# Patient Record
Sex: FEMALE | Race: WHITE | NOT HISPANIC OR LATINO | URBAN - METROPOLITAN AREA
[De-identification: names, ages, dates, MRNs, and addresses within clinical notes are randomized per-mention and may not be internally consistent; named-entity substitution may affect disease eponyms.]

---

## 2021-04-28 ENCOUNTER — OUTPATIENT (OUTPATIENT)
Dept: OUTPATIENT SERVICES | Facility: HOSPITAL | Age: 26
LOS: 1 days | Discharge: HOME | End: 2021-04-28

## 2021-04-29 DIAGNOSIS — F10.21 ALCOHOL DEPENDENCE, IN REMISSION: ICD-10-CM

## 2021-04-29 DIAGNOSIS — F34.1 DYSTHYMIC DISORDER: ICD-10-CM

## 2021-04-29 DIAGNOSIS — F50.9 EATING DISORDER, UNSPECIFIED: ICD-10-CM

## 2021-08-09 ENCOUNTER — LABORATORY RESULT (OUTPATIENT)
Age: 26
End: 2021-08-09

## 2021-08-09 ENCOUNTER — NON-APPOINTMENT (OUTPATIENT)
Age: 26
End: 2021-08-09

## 2021-08-09 ENCOUNTER — APPOINTMENT (OUTPATIENT)
Dept: INTERNAL MEDICINE | Facility: CLINIC | Age: 26
End: 2021-08-09
Payer: COMMERCIAL

## 2021-08-09 VITALS — SYSTOLIC BLOOD PRESSURE: 120 MMHG | DIASTOLIC BLOOD PRESSURE: 80 MMHG

## 2021-08-09 VITALS
BODY MASS INDEX: 23.54 KG/M2 | SYSTOLIC BLOOD PRESSURE: 128 MMHG | HEIGHT: 67 IN | WEIGHT: 150 LBS | DIASTOLIC BLOOD PRESSURE: 80 MMHG | OXYGEN SATURATION: 99 % | TEMPERATURE: 98.3 F | HEART RATE: 82 BPM

## 2021-08-09 DIAGNOSIS — Z81.8 FAMILY HISTORY OF OTHER MENTAL AND BEHAVIORAL DISORDERS: ICD-10-CM

## 2021-08-09 DIAGNOSIS — R23.3 SPONTANEOUS ECCHYMOSES: ICD-10-CM

## 2021-08-09 DIAGNOSIS — Z01.84 ENCOUNTER FOR ANTIBODY RESPONSE EXAMINATION: ICD-10-CM

## 2021-08-09 DIAGNOSIS — J03.01 ACUTE RECURRENT STREPTOCOCCAL TONSILLITIS: ICD-10-CM

## 2021-08-09 DIAGNOSIS — Z78.9 OTHER SPECIFIED HEALTH STATUS: ICD-10-CM

## 2021-08-09 DIAGNOSIS — Q12.0 CONGENITAL CATARACT: ICD-10-CM

## 2021-08-09 DIAGNOSIS — Z00.00 ENCOUNTER FOR GENERAL ADULT MEDICAL EXAMINATION W/OUT ABNORMAL FINDINGS: ICD-10-CM

## 2021-08-09 DIAGNOSIS — Z82.0 FAMILY HISTORY OF EPILEPSY AND OTHER DISEASES OF THE NERVOUS SYSTEM: ICD-10-CM

## 2021-08-09 PROCEDURE — 36415 COLL VENOUS BLD VENIPUNCTURE: CPT

## 2021-08-09 PROCEDURE — 99204 OFFICE O/P NEW MOD 45 MIN: CPT | Mod: 25

## 2021-08-09 RX ORDER — MULTIVITAMIN
TABLET ORAL
Refills: 0 | Status: ACTIVE | COMMUNITY

## 2021-08-09 NOTE — PHYSICAL EXAM
[Normal] : normal rate, regular rhythm, normal S1 and S2 and no murmur heard [No Edema] : there was no peripheral edema [Soft] : abdomen soft [Non Tender] : non-tender [Normal Gait] : normal gait [Normal Affect] : the affect was normal [Normal Mood] : the mood was normal [No Lymphadenopathy] : no lymphadenopathy [Supple] : supple [Coordination Grossly Intact] : coordination grossly intact [de-identified] : very friendly [de-identified] : cracked right upper molar tooth  [de-identified] : scattered ecchymotic areas on UE/LE

## 2021-08-09 NOTE — HEALTH RISK ASSESSMENT
[2] : 2) Feeling down, depressed, or hopeless for more than half of the days (2) [PHQ-2 Positive] : PHQ-2 Positive [PHQ-9 Positive] : PHQ-9 Positive [I have developed a follow-up plan documented below in the note.] : I have developed a follow-up plan documented below in the note.

## 2021-08-09 NOTE — PLAN
[FreeTextEntry1] : Will refer her to neurology Dr. Hall for evaluation of headaches, insomnia, and hand tremor.\par She is followed with psychiatrist, on Atomoxetine for ADHD and Buspirone for anxiety. She will soon be started on anti-depressant but unsure of medication name. \par She has spontaneous ecchymosis. Check CBC. \par Check routine annual labs today and will return in 2 weeks for a physical. \par Referred to GYN for annual and PAP smear, no prior studies. \par I referred her to a general dentist.\par \par RTO 2 weeks for physical. 
no weight-bearing restrictions

## 2021-08-09 NOTE — HISTORY OF PRESENT ILLNESS
[FreeTextEntry8] : 25 year old F comes to establish medical care. \par \par She has history of depression/anxiety and ADHD. She is followed with psychiatrist Dr. Chery Marie and followed with telemedicine visits. She was on Zoloft but now being weaned off. Prior on Clonidine but were discontinued. Currently on Buspirone and Atomoxetine. She will soon be started on new antidepressant soon. \par \par About 3 weeks ago, she fell in shower and hit front of head. She is not sure if had LOC. She had amnesia after incident. She went to [facility] ED and had negative CT head. She has issues with sleep. She feels in control of her dreams and often gets stuck in them. As a result, she often feels tired during day. She would like to see neurologist.\par \par She recently cracked her molar tooth on right upper side. She is trying to find a dentist.

## 2021-08-09 NOTE — REVIEW OF SYSTEMS
[Anxiety] : anxiety [Depression] : depression [Negative] : Heme/Lymph [Fatigue] : fatigue [Suicidal] : not suicidal [Insomnia] : insomnia [de-identified] : spontaneous bruising on body

## 2021-08-13 ENCOUNTER — NON-APPOINTMENT (OUTPATIENT)
Age: 26
End: 2021-08-13

## 2021-08-13 ENCOUNTER — APPOINTMENT (OUTPATIENT)
Dept: NEUROLOGY | Facility: CLINIC | Age: 26
End: 2021-08-13
Payer: COMMERCIAL

## 2021-08-13 VITALS
DIASTOLIC BLOOD PRESSURE: 75 MMHG | BODY MASS INDEX: 22.76 KG/M2 | HEART RATE: 99 BPM | TEMPERATURE: 97.2 F | SYSTOLIC BLOOD PRESSURE: 113 MMHG | HEIGHT: 67 IN | WEIGHT: 145 LBS

## 2021-08-13 DIAGNOSIS — G89.29 HEADACHE, UNSPECIFIED: ICD-10-CM

## 2021-08-13 DIAGNOSIS — F51.02 ADJUSTMENT INSOMNIA: ICD-10-CM

## 2021-08-13 DIAGNOSIS — R51.9 HEADACHE, UNSPECIFIED: ICD-10-CM

## 2021-08-13 PROCEDURE — 99204 OFFICE O/P NEW MOD 45 MIN: CPT

## 2021-08-13 RX ORDER — SERTRALINE HYDROCHLORIDE 100 MG/1
100 TABLET, FILM COATED ORAL
Qty: 30 | Refills: 0 | Status: DISCONTINUED | COMMUNITY
Start: 2021-08-04 | End: 2021-08-13

## 2021-08-13 RX ORDER — CLONIDINE HYDROCHLORIDE 0.1 MG/1
0.1 TABLET ORAL
Qty: 60 | Refills: 0 | Status: DISCONTINUED | COMMUNITY
Start: 2021-04-11 | End: 2021-08-13

## 2021-08-13 RX ORDER — BUPROPION HYDROCHLORIDE 100 MG/1
100 TABLET, FILM COATED, EXTENDED RELEASE ORAL
Qty: 30 | Refills: 0 | Status: DISCONTINUED | COMMUNITY
Start: 2021-04-18 | End: 2021-08-13

## 2021-08-13 NOTE — DISCUSSION/SUMMARY
[FreeTextEntry1] : She is 25-year-old patient with a history of anxiety and depression with ADHD under care of psychiatrist coming here for evaluation for insomnia with sleep disorder and bilateral hand tremors on intermittent headaches with nonfocal neurological examination.\par Recommend patient have MRI of the brain.\par Recommend patient to have EEG.\par Recommend patient to keep a headache log.\par Recommend patient to have home sleep study.\par Will patient referred to Dr. Hall for sleep evaluation.\par Followup evaluation as needed after workup is completed.\par Patient education provided and discussed with the patient.\par Recommend patient to discuss with psychiatrist regarding medication adjustment.\par Follow up with you for her medical problems.

## 2021-08-13 NOTE — REVIEW OF SYSTEMS
[Tension Headache] : tension-type headaches [Sleep Disturbances] : sleep disturbances [Anxiety] : anxiety [Depression] : depression [Negative] : Heme/Lymph [de-identified] : Hand tremors\par Insomnia

## 2021-08-13 NOTE — PHYSICAL EXAM
[General Appearance - Alert] : alert [General Appearance - In No Acute Distress] : in no acute distress [Oriented To Time, Place, And Person] : oriented to person, place, and time [Impaired Insight] : insight and judgment were intact [Affect] : the affect was normal [Person] : oriented to person [Place] : oriented to place [Time] : oriented to time [Concentration Intact] : normal concentrating ability [Visual Intact] : visual attention was ~T not ~L decreased [Naming Objects] : no difficulty naming common objects [Repeating Phrases] : no difficulty repeating a phrase [Writing A Sentence] : no difficulty writing a sentence [Fluency] : fluency intact [Comprehension] : comprehension intact [Reading] : reading intact [Past History] : adequate knowledge of personal past history [Cranial Nerves Optic (II)] : visual acuity intact bilaterally,  visual fields full to confrontation, pupils equal round and reactive to light [Cranial Nerves Oculomotor (III)] : extraocular motion intact [Cranial Nerves Trigeminal (V)] : facial sensation intact symmetrically [Cranial Nerves Facial (VII)] : face symmetrical [Cranial Nerves Vestibulocochlear (VIII)] : hearing was intact bilaterally [Cranial Nerves Glossopharyngeal (IX)] : tongue and palate midline [Cranial Nerves Accessory (XI - Cranial And Spinal)] : head turning and shoulder shrug symmetric [Cranial Nerves Hypoglossal (XII)] : there was no tongue deviation with protrusion [No Muscle Atrophy] : normal bulk in all four extremities [Motor Strength] : muscle strength was normal in all four extremities [Sensation Tactile Decrease] : light touch was intact [Balance] : balance was intact [Past-pointing] : there was no past-pointing [Tremor] : a tremor present [2+] : Ankle jerk left 2+ [Plantar Reflex Right Only] : normal on the right [Plantar Reflex Left Only] : normal on the left [Sclera] : the sclera and conjunctiva were normal [PERRL With Normal Accommodation] : pupils were equal in size, round, reactive to light, with normal accommodation [Extraocular Movements] : extraocular movements were intact [Outer Ear] : the ears and nose were normal in appearance [Oropharynx] : the oropharynx was normal [Neck Appearance] : the appearance of the neck was normal [Jugular Venous Distention Increased] : there was no jugular-venous distention [Neck Cervical Mass (___cm)] : no neck mass was observed [Thyroid Diffuse Enlargement] : the thyroid was not enlarged [Thyroid Nodule] : there were no palpable thyroid nodules [Auscultation Breath Sounds / Voice Sounds] : lungs were clear to auscultation bilaterally [Heart Rate And Rhythm] : heart rate was normal and rhythm regular [Heart Sounds] : normal S1 and S2 [Heart Sounds Gallop] : no gallops [Murmurs] : no murmurs [Heart Sounds Pericardial Friction Rub] : no pericardial rub [Full Pulse] : the pedal pulses are present [Edema] : there was no peripheral edema [Bowel Sounds] : normal bowel sounds [Abdomen Soft] : soft [Abdomen Tenderness] : non-tender [] : no hepato-splenomegaly [Abdomen Mass (___ Cm)] : no abdominal mass palpated [No CVA Tenderness] : no ~M costovertebral angle tenderness [No Spinal Tenderness] : no spinal tenderness [Abnormal Walk] : normal gait [Nail Clubbing] : no clubbing  or cyanosis of the fingernails [Musculoskeletal - Swelling] : no joint swelling seen [Motor Tone] : muscle strength and tone were normal [Skin Color & Pigmentation] : normal skin color and pigmentation [Skin Turgor] : normal skin turgor [FreeTextEntry1] : Multiple ecchymotic areas on Rt arm and Rt thigh

## 2021-08-13 NOTE — HISTORY OF PRESENT ILLNESS
[FreeTextEntry1] : She is 25-year-old patient under care of psychiatrist for depression and anxiety and ADHD followed by psychiatrist coming here for evaluation for bilateral hand tremors and insomnia with sleep disturbance and intermittent headaches coming here evaluation for predominantly evaluation for tremors on sleep problems.\par Patient states that she had tremors involving both hands for several years since age 15.\par Bilateral hand tremors bothering her when she was in her teenage years and increasing in frequency and severity recently. Started on Atomexetine for ADD and dose was increased recently and feels her tremors were more noticeable than before.\par She feels her tremors were more last few months even before the dose was increased increases with anxiety and stress.\par Denies of any change in her headaches occasional intermittent headaches non-associated with any focal symptoms. Occasional nausea with headaches increases again with stress and anxiety.\par Not related with periods.\par Also complains about difficulties with sleep at nighttime insomnia for which she was recommended to have sleep study done.\par No focal weakness, paresthesias or numbness.

## 2021-08-13 NOTE — REASON FOR VISIT
[Consultation] : a consultation visit [FreeTextEntry1] : Evaluation for Insomnia and tremors of both UE and Headaches

## 2021-08-15 DIAGNOSIS — Z87.898 PERSONAL HISTORY OF OTHER SPECIFIED CONDITIONS: ICD-10-CM

## 2021-08-15 LAB
25(OH)D3 SERPL-MCNC: 57.8 NG/ML
ALBUMIN SERPL ELPH-MCNC: 5.3 G/DL
ALP BLD-CCNC: 87 U/L
ALT SERPL-CCNC: 157 U/L
AMPHET UR-MCNC: NEGATIVE
ANION GAP SERPL CALC-SCNC: 14 MMOL/L
APPEARANCE: CLEAR
AST SERPL-CCNC: 176 U/L
BACTERIA: NEGATIVE
BARBITURATES UR-MCNC: NEGATIVE
BASOPHILS # BLD AUTO: 0.04 K/UL
BASOPHILS NFR BLD AUTO: 0.9 %
BENZODIAZ UR-MCNC: NEGATIVE
BILIRUB SERPL-MCNC: 0.8 MG/DL
BILIRUBIN URINE: NEGATIVE
BLOOD URINE: NEGATIVE
BUN SERPL-MCNC: 9 MG/DL
CALCIUM SERPL-MCNC: 10 MG/DL
CHLORIDE SERPL-SCNC: 98 MMOL/L
CHOLEST SERPL-MCNC: 239 MG/DL
CO2 SERPL-SCNC: 26 MMOL/L
COCAINE METAB.OTHER UR-MCNC: NEGATIVE
COLOR: YELLOW
COVID-19 SPIKE DOMAIN ANTIBODY INTERPRETATION: POSITIVE
CREAT SERPL-MCNC: 0.6 MG/DL
CREATININE, URINE: 96 MG/DL
EOSINOPHIL # BLD AUTO: 0.26 K/UL
EOSINOPHIL NFR BLD AUTO: 5.6 %
ESTIMATED AVERAGE GLUCOSE: 91 MG/DL
GLUCOSE QUALITATIVE U: NEGATIVE
GLUCOSE SERPL-MCNC: 100 MG/DL
HBA1C MFR BLD HPLC: 4.8 %
HCT VFR BLD CALC: 43.4 %
HDLC SERPL-MCNC: 126 MG/DL
HGB BLD-MCNC: 13.5 G/DL
HYALINE CASTS: 0 /LPF
IMM GRANULOCYTES NFR BLD AUTO: 0.2 %
KETONES URINE: NEGATIVE
LDLC SERPL CALC-MCNC: 97 MG/DL
LEUKOCYTE ESTERASE URINE: NEGATIVE
LYMPHOCYTES # BLD AUTO: 0.68 K/UL
LYMPHOCYTES NFR BLD AUTO: 14.5 %
MAN DIFF?: NORMAL
MCHC RBC-ENTMCNC: 31.1 GM/DL
MCHC RBC-ENTMCNC: 32.8 PG
MCV RBC AUTO: 105.6 FL
METHADONE UR-MCNC: NEGATIVE
METHAQUALONE UR-MCNC: NEGATIVE
MICROSCOPIC-UA: NORMAL
MONOCYTES # BLD AUTO: 0.57 K/UL
MONOCYTES NFR BLD AUTO: 12.2 %
NEUTROPHILS # BLD AUTO: 3.12 K/UL
NEUTROPHILS NFR BLD AUTO: 66.6 %
NITRITE URINE: NEGATIVE
NONHDLC SERPL-MCNC: 112 MG/DL
OPIATES UR-MCNC: NEGATIVE
PCP UR-MCNC: NEGATIVE
PH URINE: 7
PLATELET # BLD AUTO: 183 K/UL
POTASSIUM SERPL-SCNC: 4.5 MMOL/L
PROPOXYPH UR QL: NEGATIVE
PROT SERPL-MCNC: 7.8 G/DL
PROTEIN URINE: NEGATIVE
RBC # BLD: 4.11 M/UL
RBC # FLD: 13.1 %
RED BLOOD CELLS URINE: 3 /HPF
SARS-COV-2 AB SERPL IA-ACNC: >250 U/ML
SODIUM SERPL-SCNC: 137 MMOL/L
SPECIFIC GRAVITY URINE: 1.01
SQUAMOUS EPITHELIAL CELLS: 2 /HPF
THC UR QL: NORMAL
TRIGL SERPL-MCNC: 76 MG/DL
TSH SERPL-ACNC: 2.18 UIU/ML
UROBILINOGEN URINE: NORMAL
VIT B12 SERPL-MCNC: 769 PG/ML
WBC # FLD AUTO: 4.68 K/UL
WHITE BLOOD CELLS URINE: 0 /HPF

## 2021-08-16 ENCOUNTER — NON-APPOINTMENT (OUTPATIENT)
Age: 26
End: 2021-08-16

## 2021-08-24 ENCOUNTER — APPOINTMENT (OUTPATIENT)
Dept: INTERNAL MEDICINE | Facility: CLINIC | Age: 26
End: 2021-08-24

## 2021-08-25 ENCOUNTER — APPOINTMENT (OUTPATIENT)
Dept: NEUROLOGY | Facility: CLINIC | Age: 26
End: 2021-08-25
Payer: COMMERCIAL

## 2021-08-25 DIAGNOSIS — R25.1 TREMOR, UNSPECIFIED: ICD-10-CM

## 2021-08-25 PROCEDURE — 95816 EEG AWAKE AND DROWSY: CPT

## 2021-08-26 PROBLEM — R25.1 TREMOR OF BOTH HANDS: Status: ACTIVE | Noted: 2021-08-09

## 2021-09-04 ENCOUNTER — APPOINTMENT (OUTPATIENT)
Dept: MRI IMAGING | Facility: CLINIC | Age: 26
End: 2021-09-04

## 2021-10-20 ENCOUNTER — APPOINTMENT (OUTPATIENT)
Dept: INTERNAL MEDICINE | Facility: CLINIC | Age: 26
End: 2021-10-20
Payer: COMMERCIAL

## 2021-10-20 VITALS
SYSTOLIC BLOOD PRESSURE: 124 MMHG | BODY MASS INDEX: 21.97 KG/M2 | TEMPERATURE: 98 F | WEIGHT: 140 LBS | HEIGHT: 67 IN | OXYGEN SATURATION: 99 % | HEART RATE: 74 BPM | DIASTOLIC BLOOD PRESSURE: 90 MMHG

## 2021-10-20 VITALS — DIASTOLIC BLOOD PRESSURE: 80 MMHG | SYSTOLIC BLOOD PRESSURE: 118 MMHG

## 2021-10-20 DIAGNOSIS — N93.9 ABNORMAL UTERINE AND VAGINAL BLEEDING, UNSPECIFIED: ICD-10-CM

## 2021-10-20 DIAGNOSIS — R74.01 ELEVATION OF LEVELS OF LIVER TRANSAMINASE LEVELS: ICD-10-CM

## 2021-10-20 LAB — APAP SPEC QL: <15 UG/ML

## 2021-10-20 PROCEDURE — G0008: CPT

## 2021-10-20 PROCEDURE — 90686 IIV4 VACC NO PRSV 0.5 ML IM: CPT

## 2021-10-20 PROCEDURE — 36415 COLL VENOUS BLD VENIPUNCTURE: CPT

## 2021-10-20 PROCEDURE — 99214 OFFICE O/P EST MOD 30 MIN: CPT | Mod: 25

## 2021-10-20 RX ORDER — ATOMOXETINE 40 MG/1
40 CAPSULE ORAL
Qty: 30 | Refills: 2 | Status: DISCONTINUED | COMMUNITY
End: 2021-10-20

## 2021-10-20 RX ORDER — BUSPIRONE HYDROCHLORIDE 10 MG/1
10 TABLET ORAL
Qty: 60 | Refills: 0 | Status: DISCONTINUED | COMMUNITY
End: 2021-10-20

## 2021-10-20 RX ORDER — SERTRALINE 25 MG/1
25 TABLET, FILM COATED ORAL
Qty: 90 | Refills: 0 | Status: DISCONTINUED | COMMUNITY
Start: 2021-05-24 | End: 2021-10-20

## 2021-10-20 NOTE — HEALTH RISK ASSESSMENT
[2] : 2) Feeling down, depressed, or hopeless for more than half of the days (2) [PHQ-2 Positive] : PHQ-2 Positive [I have developed a follow-up plan documented below in the note.] : I have developed a follow-up plan documented below in the note. [[id number]] : 4

## 2021-10-20 NOTE — HISTORY OF PRESENT ILLNESS
[FreeTextEntry1] : F/U transaminitis  [de-identified] : Patient comes for follow-up.\par \par She stopped her Atomoxetine and Buspirone 1 month ago. She has not seen her psychiatrist recently.\par She saw neurologist for headaches and hand tremor. MRI head was normal. EEG was performed, no results seen in chart.\par She reports occasional vaginal spotting. Menses sometimes can stretch for weeks. She needs to see GYN.\par Labs in 8/21 revealed elevated LFT's,  . For repeat labs today.

## 2021-10-20 NOTE — REVIEW OF SYSTEMS
[Fatigue] : fatigue [Insomnia] : insomnia [Anxiety] : anxiety [Depression] : depression [Negative] : Heme/Lymph [Suicidal] : not suicidal [de-identified] : spontaneous bruising on body [de-identified] : hand tremor

## 2021-10-20 NOTE — PHYSICAL EXAM
[No Lymphadenopathy] : no lymphadenopathy [Supple] : supple [Normal] : normal rate, regular rhythm, normal S1 and S2 and no murmur heard [No Edema] : there was no peripheral edema [Soft] : abdomen soft [Non Tender] : non-tender [Coordination Grossly Intact] : coordination grossly intact [Normal Gait] : normal gait [Normal Affect] : the affect was normal [Normal Mood] : the mood was normal [de-identified] : friendly [de-identified] : scattered ecchymotic areas on UE/LE  [de-identified] : b/l resting hand tremor

## 2021-10-20 NOTE — PLAN
[FreeTextEntry1] : Repeat LFT's, labs ordered for transaminitis workup. Consider liver imaging. \par Follow-up with psychiatry. She is off all medications.\par Neurology workup for headaches unrevealing. Hand tremor likely intentional. \par GYN referral for irregular menses and spotting. She is sexually active.\par Flu shot given today.

## 2021-10-21 ENCOUNTER — NON-APPOINTMENT (OUTPATIENT)
Age: 26
End: 2021-10-21

## 2021-10-21 LAB
A1AT SERPL-MCNC: 183 MG/DL
ALBUMIN SERPL ELPH-MCNC: 4.7 G/DL
ALP BLD-CCNC: 103 U/L
ALT SERPL-CCNC: 86 U/L
ANION GAP SERPL CALC-SCNC: 17 MMOL/L
AST SERPL-CCNC: 152 U/L
BILIRUB SERPL-MCNC: 0.8 MG/DL
BUN SERPL-MCNC: 14 MG/DL
CALCIUM SERPL-MCNC: 10.3 MG/DL
CERULOPLASMIN SERPL-MCNC: 28 MG/DL
CHLORIDE SERPL-SCNC: 96 MMOL/L
CO2 SERPL-SCNC: 25 MMOL/L
CREAT SERPL-MCNC: 0.61 MG/DL
FERRITIN SERPL-MCNC: 197 NG/ML
GLUCOSE SERPL-MCNC: 103 MG/DL
HAV IGM SER QL: NONREACTIVE
HBV CORE IGM SER QL: NONREACTIVE
HBV SURFACE AG SER QL: NONREACTIVE
HCV AB SER QL: NONREACTIVE
HCV S/CO RATIO: 0.18 S/CO
IRON SATN MFR SERPL: NORMAL %
IRON SERPL-MCNC: 319 UG/DL
MITOCHONDRIA AB SER IF-ACNC: NORMAL
POTASSIUM SERPL-SCNC: 3.9 MMOL/L
PROT SERPL-MCNC: 7.5 G/DL
SMOOTH MUSCLE AB SER QL IF: NORMAL
SODIUM SERPL-SCNC: 138 MMOL/L
TIBC SERPL-MCNC: NORMAL UG/DL
UIBC SERPL-MCNC: <20 UG/DL

## 2021-10-22 ENCOUNTER — APPOINTMENT (OUTPATIENT)
Dept: INTERNAL MEDICINE | Facility: CLINIC | Age: 26
End: 2021-10-22

## 2021-10-24 LAB
ANA PAT FLD IF-IMP: NORMAL
ANA SER IF-ACNC: ABNORMAL

## 2021-10-25 ENCOUNTER — APPOINTMENT (OUTPATIENT)
Dept: INTERNAL MEDICINE | Facility: CLINIC | Age: 26
End: 2021-10-25

## 2021-11-12 ENCOUNTER — APPOINTMENT (OUTPATIENT)
Dept: NEUROLOGY | Facility: CLINIC | Age: 26
End: 2021-11-12

## 2022-02-02 ENCOUNTER — APPOINTMENT (OUTPATIENT)
Dept: INTERNAL MEDICINE | Facility: CLINIC | Age: 27
End: 2022-02-02
Payer: COMMERCIAL

## 2022-02-02 DIAGNOSIS — Z23 ENCOUNTER FOR IMMUNIZATION: ICD-10-CM

## 2022-02-02 DIAGNOSIS — R21 RASH AND OTHER NONSPECIFIC SKIN ERUPTION: ICD-10-CM

## 2022-02-02 PROCEDURE — 0064A: CPT

## 2022-02-02 RX ORDER — TRIAMCINOLONE ACETONIDE 1 MG/G
0.1 CREAM TOPICAL TWICE DAILY
Qty: 1 | Refills: 1 | Status: ACTIVE | COMMUNITY
Start: 2022-02-02 | End: 1900-01-01

## 2022-04-27 ENCOUNTER — APPOINTMENT (OUTPATIENT)
Dept: INTERNAL MEDICINE | Facility: CLINIC | Age: 27
End: 2022-04-27
Payer: COMMERCIAL

## 2022-04-27 VITALS
HEART RATE: 84 BPM | BODY MASS INDEX: 21.97 KG/M2 | OXYGEN SATURATION: 99 % | DIASTOLIC BLOOD PRESSURE: 70 MMHG | WEIGHT: 140 LBS | SYSTOLIC BLOOD PRESSURE: 110 MMHG | HEIGHT: 67 IN

## 2022-04-27 DIAGNOSIS — F44.9 DISSOCIATIVE AND CONVERSION DISORDER, UNSPECIFIED: ICD-10-CM

## 2022-04-27 DIAGNOSIS — F41.8 OTHER SPECIFIED ANXIETY DISORDERS: ICD-10-CM

## 2022-04-27 DIAGNOSIS — E83.19 OTHER DISORDERS OF IRON METABOLISM: ICD-10-CM

## 2022-04-27 DIAGNOSIS — F90.9 ATTENTION-DEFICIT HYPERACTIVITY DISORDER, UNSPECIFIED TYPE: ICD-10-CM

## 2022-04-27 PROCEDURE — 36415 COLL VENOUS BLD VENIPUNCTURE: CPT

## 2022-04-27 PROCEDURE — 99213 OFFICE O/P EST LOW 20 MIN: CPT | Mod: 25

## 2022-04-27 NOTE — HISTORY OF PRESENT ILLNESS
[FreeTextEntry1] : ER follow-up [de-identified] : Patient comes for follow-up.\par \par She went to [facility] ER last Monday after p/w panic attack and sensation of inability to move body. Body felt limp at home. She was seen in ED by a psychiatrist, was diagnosed with conversion disorder. She was discharged home. She has good family support. Has new boyfriend and feels happy. She stopped seeing her psychiatrist in 9/21. She is off all medications. She is trying to find new psychiatrist and therapist.

## 2022-04-27 NOTE — PLAN
[FreeTextEntry1] : She will look for new psychiatrist and therapist. I will provide behavioral health referral. Provided contact info for [facility].\par Recheck CBC and iron studies. Check hemochromatosis gene mutation.

## 2022-04-27 NOTE — REVIEW OF SYSTEMS
[Fatigue] : fatigue [Insomnia] : insomnia [Anxiety] : anxiety [Depression] : depression [Negative] : Heme/Lymph [Suicidal] : not suicidal [de-identified] : spontaneous bruising on body [de-identified] : hand tremor

## 2022-04-27 NOTE — PHYSICAL EXAM
[No Lymphadenopathy] : no lymphadenopathy [Supple] : supple [Normal] : normal rate, regular rhythm, normal S1 and S2 and no murmur heard [No Edema] : there was no peripheral edema [Soft] : abdomen soft [Non Tender] : non-tender [Coordination Grossly Intact] : coordination grossly intact [Normal Gait] : normal gait [Normal Affect] : the affect was normal [Normal Mood] : the mood was normal [de-identified] : friendly [de-identified] : scattered ecchymotic areas on RUE

## 2022-05-04 ENCOUNTER — TRANSCRIPTION ENCOUNTER (OUTPATIENT)
Age: 27
End: 2022-05-04

## 2022-05-19 ENCOUNTER — NON-APPOINTMENT (OUTPATIENT)
Age: 27
End: 2022-05-19

## 2022-05-19 DIAGNOSIS — Z14.8 GENETIC CARRIER OF OTHER DISEASE: ICD-10-CM

## 2022-05-19 LAB
BASOPHILS # BLD AUTO: 0.07 K/UL
BASOPHILS NFR BLD AUTO: 1.6 %
EOSINOPHIL # BLD AUTO: 0.26 K/UL
EOSINOPHIL NFR BLD AUTO: 6.1 %
FERRITIN SERPL-MCNC: 141 NG/ML
HCT VFR BLD CALC: 37.9 %
HGB BLD-MCNC: 12.5 G/DL
IMM GRANULOCYTES NFR BLD AUTO: 0.2 %
IRON SATN MFR SERPL: 11 %
IRON SERPL-MCNC: 38 UG/DL
LYMPHOCYTES # BLD AUTO: 1.44 K/UL
LYMPHOCYTES NFR BLD AUTO: 33.6 %
MAN DIFF?: NORMAL
MCHC RBC-ENTMCNC: 33 GM/DL
MCHC RBC-ENTMCNC: 35.3 PG
MCV RBC AUTO: 107.1 FL
MONOCYTES # BLD AUTO: 0.51 K/UL
MONOCYTES NFR BLD AUTO: 11.9 %
NEUTROPHILS # BLD AUTO: 2 K/UL
NEUTROPHILS NFR BLD AUTO: 46.6 %
PLATELET # BLD AUTO: 292 K/UL
RBC # BLD: 3.54 M/UL
RBC # FLD: 12.2 %
TIBC SERPL-MCNC: 332 UG/DL
TM INTERPRETATION: NORMAL
TRANSFERRIN SERPL-MCNC: 275 MG/DL
UIBC SERPL-MCNC: 294 UG/DL
WBC # FLD AUTO: 4.29 K/UL

## 2022-05-23 ENCOUNTER — APPOINTMENT (OUTPATIENT)
Dept: INTERNAL MEDICINE | Facility: CLINIC | Age: 27
End: 2022-05-23

## 2024-12-18 ENCOUNTER — APPOINTMENT (OUTPATIENT)
Dept: INTERNAL MEDICINE | Facility: CLINIC | Age: 29
End: 2024-12-18

## 2024-12-18 VITALS
HEART RATE: 70 BPM | SYSTOLIC BLOOD PRESSURE: 112 MMHG | BODY MASS INDEX: 32.96 KG/M2 | DIASTOLIC BLOOD PRESSURE: 76 MMHG | WEIGHT: 210 LBS | OXYGEN SATURATION: 98 % | TEMPERATURE: 98.1 F | HEIGHT: 67 IN

## 2024-12-18 DIAGNOSIS — E66.9 OBESITY, UNSPECIFIED: ICD-10-CM

## 2024-12-18 DIAGNOSIS — R74.01 ELEVATION OF LEVELS OF LIVER TRANSAMINASE LEVELS: ICD-10-CM

## 2024-12-18 DIAGNOSIS — F41.8 OTHER SPECIFIED ANXIETY DISORDERS: ICD-10-CM

## 2024-12-18 DIAGNOSIS — Z00.00 ENCOUNTER FOR GENERAL ADULT MEDICAL EXAMINATION W/OUT ABNORMAL FINDINGS: ICD-10-CM

## 2024-12-18 PROCEDURE — 36415 COLL VENOUS BLD VENIPUNCTURE: CPT

## 2024-12-18 PROCEDURE — 99395 PREV VISIT EST AGE 18-39: CPT | Mod: 25

## 2024-12-18 PROCEDURE — G0008: CPT

## 2024-12-18 PROCEDURE — G0447 BEHAVIOR COUNSEL OBESITY 15M: CPT | Mod: NC,59

## 2024-12-18 PROCEDURE — 90656 IIV3 VACC NO PRSV 0.5 ML IM: CPT

## 2024-12-18 RX ORDER — ETONOGESTREL AND ETHINYL ESTRADIOL .12; .015 MG/D; MG/D
INSERT, EXTENDED RELEASE VAGINAL
Refills: 0 | Status: ACTIVE | COMMUNITY

## 2024-12-20 LAB
ALBUMIN SERPL ELPH-MCNC: 4.6 G/DL
ALP BLD-CCNC: 80 U/L
ALT SERPL-CCNC: 15 U/L
ANION GAP SERPL CALC-SCNC: 14 MMOL/L
APPEARANCE: CLEAR
AST SERPL-CCNC: 19 U/L
BACTERIA: ABNORMAL /HPF
BASOPHILS # BLD AUTO: 0.03 K/UL
BASOPHILS NFR BLD AUTO: 0.4 %
BILIRUB SERPL-MCNC: 0.4 MG/DL
BILIRUBIN URINE: NEGATIVE
BLOOD URINE: NEGATIVE
BUN SERPL-MCNC: 9 MG/DL
CALCIUM SERPL-MCNC: 9.6 MG/DL
CAST: 1 /LPF
CHLORIDE SERPL-SCNC: 107 MMOL/L
CHOLEST SERPL-MCNC: 164 MG/DL
CO2 SERPL-SCNC: 20 MMOL/L
COLOR: YELLOW
CREAT SERPL-MCNC: 0.77 MG/DL
EGFR: 108 ML/MIN/1.73M2
EOSINOPHIL # BLD AUTO: 0.4 K/UL
EOSINOPHIL NFR BLD AUTO: 5.2 %
EPITHELIAL CELLS: 8 /HPF
ESTIMATED AVERAGE GLUCOSE: 108 MG/DL
GLUCOSE QUALITATIVE U: NEGATIVE MG/DL
GLUCOSE SERPL-MCNC: 99 MG/DL
HBA1C MFR BLD HPLC: 5.4 %
HCT VFR BLD CALC: 42.2 %
HDLC SERPL-MCNC: 49 MG/DL
HGB BLD-MCNC: 13.8 G/DL
IMM GRANULOCYTES NFR BLD AUTO: 0.3 %
KETONES URINE: ABNORMAL MG/DL
LDLC SERPL CALC-MCNC: 89 MG/DL
LEUKOCYTE ESTERASE URINE: NEGATIVE
LYMPHOCYTES # BLD AUTO: 2.23 K/UL
LYMPHOCYTES NFR BLD AUTO: 29 %
MAN DIFF?: NORMAL
MCHC RBC-ENTMCNC: 30.5 PG
MCHC RBC-ENTMCNC: 32.7 G/DL
MCV RBC AUTO: 93.4 FL
MICROSCOPIC-UA: NORMAL
MONOCYTES # BLD AUTO: 0.59 K/UL
MONOCYTES NFR BLD AUTO: 7.7 %
NEUTROPHILS # BLD AUTO: 4.41 K/UL
NEUTROPHILS NFR BLD AUTO: 57.4 %
NITRITE URINE: NEGATIVE
NONHDLC SERPL-MCNC: 114 MG/DL
PH URINE: 5.5
PLATELET # BLD AUTO: 278 K/UL
POTASSIUM SERPL-SCNC: 4.3 MMOL/L
PROT SERPL-MCNC: 7.4 G/DL
PROTEIN URINE: NEGATIVE MG/DL
RBC # BLD: 4.52 M/UL
RBC # FLD: 12.1 %
RED BLOOD CELLS URINE: 2 /HPF
SODIUM SERPL-SCNC: 141 MMOL/L
SPECIFIC GRAVITY URINE: 1.03
TRIGL SERPL-MCNC: 148 MG/DL
TSH SERPL-ACNC: 1.98 UIU/ML
UROBILINOGEN URINE: 0.2 MG/DL
VIT B12 SERPL-MCNC: 431 PG/ML
WBC # FLD AUTO: 7.68 K/UL
WHITE BLOOD CELLS URINE: 5 /HPF